# Patient Record
Sex: FEMALE | Race: WHITE | NOT HISPANIC OR LATINO | Employment: FULL TIME | URBAN - METROPOLITAN AREA
[De-identification: names, ages, dates, MRNs, and addresses within clinical notes are randomized per-mention and may not be internally consistent; named-entity substitution may affect disease eponyms.]

---

## 2023-12-27 ENCOUNTER — TELEPHONE (OUTPATIENT)
Dept: OBSTETRICS AND GYNECOLOGY | Facility: CLINIC | Age: 23
End: 2023-12-27
Payer: COMMERCIAL

## 2023-12-27 ENCOUNTER — ROUTINE PRENATAL (OUTPATIENT)
Dept: OBSTETRICS AND GYNECOLOGY | Facility: CLINIC | Age: 23
End: 2023-12-27
Payer: COMMERCIAL

## 2023-12-27 VITALS — SYSTOLIC BLOOD PRESSURE: 115 MMHG | DIASTOLIC BLOOD PRESSURE: 76 MMHG | BODY MASS INDEX: 32.19 KG/M2 | WEIGHT: 176 LBS

## 2023-12-27 DIAGNOSIS — Z34.83 ENCOUNTER FOR SUPERVISION OF OTHER NORMAL PREGNANCY IN THIRD TRIMESTER: Primary | ICD-10-CM

## 2023-12-27 DIAGNOSIS — Z28.39 RUBELLA NON-IMMUNE STATUS, ANTEPARTUM: ICD-10-CM

## 2023-12-27 DIAGNOSIS — Z28.39 MATERNAL VARICELLA, NON-IMMUNE: ICD-10-CM

## 2023-12-27 DIAGNOSIS — O09.899 RUBELLA NON-IMMUNE STATUS, ANTEPARTUM: ICD-10-CM

## 2023-12-27 DIAGNOSIS — O09.899 MATERNAL VARICELLA, NON-IMMUNE: ICD-10-CM

## 2023-12-27 DIAGNOSIS — O99.013 ANEMIA DURING PREGNANCY IN THIRD TRIMESTER: ICD-10-CM

## 2023-12-27 LAB
GLUCOSE UR STRIP-MCNC: NEGATIVE MG/DL
PROT UR STRIP-MCNC: ABNORMAL MG/DL

## 2023-12-27 RX ORDER — CARBOPROST TROMETHAMINE 250 UG/ML
250 INJECTION, SOLUTION INTRAMUSCULAR
Status: CANCELLED | OUTPATIENT
Start: 2023-12-27

## 2023-12-27 RX ORDER — MAGNESIUM CARB/ALUMINUM HYDROX 105-160MG
30 TABLET,CHEWABLE ORAL ONCE AS NEEDED
Status: CANCELLED | OUTPATIENT
Start: 2023-12-27

## 2023-12-27 RX ORDER — ONDANSETRON 4 MG/1
4 TABLET, ORALLY DISINTEGRATING ORAL EVERY 6 HOURS PRN
Status: CANCELLED | OUTPATIENT
Start: 2023-12-27

## 2023-12-27 RX ORDER — TERBUTALINE SULFATE 1 MG/ML
0.25 INJECTION, SOLUTION SUBCUTANEOUS AS NEEDED
Status: CANCELLED | OUTPATIENT
Start: 2023-12-27

## 2023-12-27 RX ORDER — SODIUM CHLORIDE 9 MG/ML
40 INJECTION, SOLUTION INTRAVENOUS AS NEEDED
Status: CANCELLED | OUTPATIENT
Start: 2023-12-27

## 2023-12-27 RX ORDER — MISOPROSTOL 100 UG/1
800 TABLET ORAL ONCE AS NEEDED
Status: CANCELLED | OUTPATIENT
Start: 2023-12-27

## 2023-12-27 RX ORDER — METHYLERGONOVINE MALEATE 0.2 MG/ML
200 INJECTION INTRAVENOUS ONCE AS NEEDED
Status: CANCELLED | OUTPATIENT
Start: 2023-12-27

## 2023-12-27 RX ORDER — ONDANSETRON 2 MG/ML
4 INJECTION INTRAMUSCULAR; INTRAVENOUS EVERY 6 HOURS PRN
Status: CANCELLED | OUTPATIENT
Start: 2023-12-27

## 2023-12-27 RX ORDER — MISOPROSTOL 100 UG/1
25 TABLET ORAL
Status: CANCELLED | OUTPATIENT
Start: 2023-12-27 | End: 2023-12-27

## 2023-12-27 RX ORDER — FAMOTIDINE 10 MG/ML
20 INJECTION, SOLUTION INTRAVENOUS 2 TIMES DAILY PRN
Status: CANCELLED | OUTPATIENT
Start: 2023-12-27

## 2023-12-27 RX ORDER — OXYTOCIN/0.9 % SODIUM CHLORIDE 30/500 ML
999 PLASTIC BAG, INJECTION (ML) INTRAVENOUS ONCE
Status: CANCELLED | OUTPATIENT
Start: 2023-12-27

## 2023-12-27 RX ORDER — OXYTOCIN/0.9 % SODIUM CHLORIDE 30/500 ML
250 PLASTIC BAG, INJECTION (ML) INTRAVENOUS CONTINUOUS
Status: CANCELLED | OUTPATIENT
Start: 2023-12-27 | End: 2023-12-27

## 2023-12-27 RX ORDER — LIDOCAINE HYDROCHLORIDE 10 MG/ML
0.5 INJECTION, SOLUTION INFILTRATION; PERINEURAL ONCE AS NEEDED
Status: CANCELLED | OUTPATIENT
Start: 2023-12-27

## 2023-12-27 RX ORDER — SODIUM CHLORIDE 0.9 % (FLUSH) 0.9 %
10 SYRINGE (ML) INJECTION AS NEEDED
Status: CANCELLED | OUTPATIENT
Start: 2023-12-27

## 2023-12-27 RX ORDER — ACETAMINOPHEN 325 MG/1
650 TABLET ORAL EVERY 4 HOURS PRN
Status: CANCELLED | OUTPATIENT
Start: 2023-12-27

## 2023-12-27 RX ORDER — SODIUM CHLORIDE 0.9 % (FLUSH) 0.9 %
10 SYRINGE (ML) INJECTION EVERY 12 HOURS SCHEDULED
Status: CANCELLED | OUTPATIENT
Start: 2023-12-27

## 2023-12-27 RX ORDER — FAMOTIDINE 10 MG
20 TABLET ORAL 2 TIMES DAILY PRN
Status: CANCELLED | OUTPATIENT
Start: 2023-12-27

## 2023-12-27 RX ORDER — SODIUM CHLORIDE, SODIUM LACTATE, POTASSIUM CHLORIDE, CALCIUM CHLORIDE 600; 310; 30; 20 MG/100ML; MG/100ML; MG/100ML; MG/100ML
125 INJECTION, SOLUTION INTRAVENOUS CONTINUOUS
Status: CANCELLED | OUTPATIENT
Start: 2023-12-27

## 2023-12-27 NOTE — PROGRESS NOTES
Ob follow up    Caleb Arriaza is a 23 y.o.  38w4d patient being seen today for her obstetrical visit. Patient reports no complaints. Fetal movement: normal.    Her prenatal care is complicated by (and status) : rubella/varicella non-immune, anemia       ROS -   Fetal Movement good   Vaginal bleeding none   Cramping/Contractions intermittent      /76   Wt 79.8 kg (176 lb)   LMP 2023   BMI 32.19 kg/m²     Prenatal Assessment  Fetal Heart Rate: 134  Fundal Height (cm): 33 cm  Movement: Present  Presentation: Vertex  Dilation/Effacement/Station  Dilation: 1  Effacement (%): 30  Station: -2  Edema  LLE Edema: Trace  RLE Edema: Trace  Facial Edema: None      Assessment    Diagnoses and all orders for this visit:    1. Encounter for supervision of other normal pregnancy in third trimester (Primary)  -     POC Urinalysis Dipstick  -     Labor Induction  -     sodium chloride 0.9 % flush 10 mL  -     sodium chloride 0.9 % flush 10 mL  -     sodium chloride 0.9 % infusion 40 mL  -     lidocaine (XYLOCAINE) 1 % injection 0.5 mL  -     lactated ringers bolus 1,000 mL  -     lactated ringers infusion  -     acetaminophen (TYLENOL) tablet 650 mg  -     ondansetron ODT (ZOFRAN-ODT) disintegrating tablet 4 mg  -     ondansetron (ZOFRAN) injection 4 mg  -     miSOPROStol (CYTOTEC) tablet 25 mcg  -     terbutaline (BRETHINE) injection 0.25 mg  -     mineral oil liquid 30 mL  -     famotidine (PEPCID) injection 20 mg  -     famotidine (PEPCID) tablet 20 mg  -     oxytocin (PITOCIN) 30 units in 0.9% sodium chloride 500 mL (premix)  -     oxytocin (PITOCIN) 30 units in 0.9% sodium chloride 500 mL (premix)  -     methylergonovine (METHERGINE) injection 200 mcg  -     carboprost (HEMABATE) injection 250 mcg  -     miSOPROStol (CYTOTEC) tablet 800 mcg  -     Tranexamic Acid 1,000 mg in sodium chloride 0.9 % 100 mL  -     ondansetron ODT (ZOFRAN-ODT) disintegrating tablet 4 mg  -     ondansetron (ZOFRAN) injection 4  mg    2. Rubella non-immune status, antepartum    3. Maternal varicella, non-immune    4. Anemia during pregnancy in third trimester    Other orders  -     Admit To Obstetrics Inpatient; Standing  -     Code Status and Medical Interventions:; Standing  -     Obtain Informed Consent; Standing  -     Place Sequential Compression Device; Standing  -     Maintain Sequential Compression Device; Standing  -     Vital Signs q 4 while awake; Standing  -     Vital Signs Per Hospital Policy; Standing  -     Mini-Prep Prior to Delivery; Standing  -     Continuous Fetal Monitoring With NST on Admission and Prior to Initiation of Oxytocin.; Standing  -     External Uterine Contraction Monitoring; Standing  -     Notify Provider (Specified); Standing  -     Notify Provider of Tachysystole (Per Hospital Algorithm); Standing  -     Notify Provider if Membranes Ruptured, Bleeding Greater Than 1 Pad Per Hour, Fetal Heart Tone Abnormality or Severe Pain; Standing  -     May Ambulate if Membranes Intact or Head Engaged With Ruptured BOW or Normal Tracing for 20 Minutes; Standing  -     Initiate Group Beta Strep (GBS) Prophylaxis Protocol, If Criteria Met; Standing  -     Position Change - For Intra-Uterine Resusitation for Hypertonus, HyperStimulation or Non-Reassuring Fetal Status; Standing  -     NPO Diet NPO Type: Ice Chips; Standing  -     Inpatient Anesthesiology Consult; Standing  -     CBC & Differential; Standing  -     Comprehensive Metabolic Panel; Standing  -     Type & Screen; Standing  -     Insert Peripheral IV; Standing  -     Saline Lock & Maintain IV Access; Standing  -     Notify Provider (Specified); Standing  -     Vital Signs Per Hospital Policy; Standing  -     Fundal & Lochia Check; Standing  -     Fundal & Lochia Check; Standing  -     Diet: Regular/House Diet; Texture: Regular Texture (IDDSI 7); Fluid Consistency: Thin (IDDSI 0); Standing  -     Advance Diet As Tolerated -; Standing        1) Pregnancy at  38w4d  2) Fetal status reassuring   3) GBS status - negative  4) Rubella/varicella non-immune   Avoid exposure, consider vaccination postpartum   5) anemia - moderate  Stable on oral iron     Desires induction   Trial on 12/30//23, instructions reviewed.     Plan    Labor warnings   [facility] [place] [facility]        Murphy Varela MD   12/27/2023  10:37 EST

## 2023-12-27 NOTE — Clinical Note
Mignon, can you see who is scheduling - I need her on for Friday into Saturday at Midnight at L&D - Thanks, Dr. Varela

## 2023-12-27 NOTE — PATIENT INSTRUCTIONS
You have scheduled the birth of your baby and are ready to welcome a new member of your family. To prepare you for that day, review these helpful instructions.     INDUCTION   In preparation for this we have you call L&D approximately 2 hours prior to scheduled time to arrive @ [phone]  Please be aware that when you are calling it is to see if they are able to have you arrive at the expected time.   Sometimes they may have to delay the start time due to ongoing issues on L&D (room availability, staffing).   If that happens, rest assured we are working to get you in as soon as safe and reasonable.   During any wait times, it is okay to eat and drink, but we would prefer you avoid heavy or large meals if possible.     BEFORE YOUR  SECTION:  You should have nothing to eat or drink 8 hours prior to the scheduled time of your surgery, typically midnight the evening before surgery  You should arrive 2 hours prior to the start time of your procedure with anyone planning to go back with you for the procedure.   We suggest taking two 500 mg tylenol the evening before surgery to help with pain management.   We also suggest drinking a 20 oz sports drink 2-2.5 hours before surgery (typically on the way to the hospital)  Please do not apply any lotions within 48 hours prior to surgery/ delivery.  Please do not shave within 48 hours prior to surgery/ delivery.   Please bathe/shower with an antibacterial soap the night before your surgery.

## 2023-12-30 ENCOUNTER — HOSPITAL ENCOUNTER (EMERGENCY)
Dept: LABOR AND DELIVERY | Facility: HOSPITAL | Age: 23
Discharge: HOME | End: 2023-12-30
Payer: COMMERCIAL

## 2023-12-30 ENCOUNTER — HOSPITAL ENCOUNTER (EMERGENCY)
Facility: HOSPITAL | Age: 23
Discharge: HOME | End: 2023-12-30
Attending: OBSTETRICS & GYNECOLOGY | Admitting: OBSTETRICS & GYNECOLOGY
Payer: COMMERCIAL

## 2023-12-30 VITALS
SYSTOLIC BLOOD PRESSURE: 103 MMHG | RESPIRATION RATE: 18 BRPM | OXYGEN SATURATION: 98 % | WEIGHT: 178.8 LBS | DIASTOLIC BLOOD PRESSURE: 72 MMHG | HEART RATE: 85 BPM | BODY MASS INDEX: 32.7 KG/M2 | TEMPERATURE: 97.7 F

## 2023-12-30 LAB — A1 MICROGLOB PLACENTAL VAG QL: NEGATIVE

## 2023-12-30 PROCEDURE — 99284 EMERGENCY DEPT VISIT MOD MDM: CPT | Performed by: OBSTETRICS & GYNECOLOGY

## 2023-12-30 PROCEDURE — 84112 EVAL AMNIOTIC FLUID PROTEIN: CPT | Performed by: OBSTETRICS & GYNECOLOGY

## 2023-12-30 PROCEDURE — 59025 FETAL NON-STRESS TEST: CPT

## 2023-12-30 RX ORDER — ACETAMINOPHEN 500 MG
1000 TABLET ORAL EVERY 6 HOURS PRN
COMMUNITY

## 2023-12-31 ENCOUNTER — ANESTHESIA EVENT (OUTPATIENT)
Dept: LABOR AND DELIVERY | Facility: HOSPITAL | Age: 23
End: 2023-12-31
Payer: COMMERCIAL

## 2023-12-31 ENCOUNTER — ANESTHESIA (OUTPATIENT)
Dept: LABOR AND DELIVERY | Facility: HOSPITAL | Age: 23
End: 2023-12-31
Payer: COMMERCIAL

## 2023-12-31 ENCOUNTER — HOSPITAL ENCOUNTER (INPATIENT)
Facility: HOSPITAL | Age: 23
LOS: 2 days | Discharge: HOME | End: 2024-01-02
Attending: OBSTETRICS & GYNECOLOGY | Admitting: OBSTETRICS & GYNECOLOGY
Payer: COMMERCIAL

## 2023-12-31 DIAGNOSIS — Z34.83 ENCOUNTER FOR SUPERVISION OF OTHER NORMAL PREGNANCY IN THIRD TRIMESTER: ICD-10-CM

## 2023-12-31 LAB
ABO GROUP BLD: NORMAL
ALBUMIN SERPL-MCNC: 3.4 G/DL (ref 3.5–5.2)
ALBUMIN/GLOB SERPL: 1.2 G/DL
ALP SERPL-CCNC: 113 U/L (ref 39–117)
ALT SERPL W P-5'-P-CCNC: 9 U/L (ref 1–33)
ANION GAP SERPL CALCULATED.3IONS-SCNC: 11.5 MMOL/L (ref 5–15)
AST SERPL-CCNC: 10 U/L (ref 1–32)
BASOPHILS # BLD AUTO: 0.02 10*3/MM3 (ref 0–0.2)
BASOPHILS NFR BLD AUTO: 0.2 % (ref 0–1.5)
BILIRUB SERPL-MCNC: <0.2 MG/DL (ref 0–1.2)
BLD GP AB SCN SERPL QL: POSITIVE
BUN SERPL-MCNC: 9 MG/DL (ref 6–20)
BUN/CREAT SERPL: 17 (ref 7–25)
CALCIUM SPEC-SCNC: 9.7 MG/DL (ref 8.6–10.5)
CHLORIDE SERPL-SCNC: 106 MMOL/L (ref 98–107)
CO2 SERPL-SCNC: 18.5 MMOL/L (ref 22–29)
CREAT SERPL-MCNC: 0.53 MG/DL (ref 0.57–1)
DEPRECATED RDW RBC AUTO: 46.7 FL (ref 37–54)
EGFRCR SERPLBLD CKD-EPI 2021: 132.6 ML/MIN/1.73
EOSINOPHIL # BLD AUTO: 0.05 10*3/MM3 (ref 0–0.4)
EOSINOPHIL NFR BLD AUTO: 0.6 % (ref 0.3–6.2)
ERYTHROCYTE [DISTWIDTH] IN BLOOD BY AUTOMATED COUNT: 16.2 % (ref 12.3–15.4)
GLOBULIN UR ELPH-MCNC: 2.8 GM/DL
GLUCOSE SERPL-MCNC: 78 MG/DL (ref 65–99)
HCT VFR BLD AUTO: 29.7 % (ref 34–46.6)
HGB BLD-MCNC: 9.4 G/DL (ref 12–15.9)
IMM GRANULOCYTES # BLD AUTO: 0.05 10*3/MM3 (ref 0–0.05)
IMM GRANULOCYTES NFR BLD AUTO: 0.6 % (ref 0–0.5)
LYMPHOCYTES # BLD AUTO: 1.3 10*3/MM3 (ref 0.7–3.1)
LYMPHOCYTES NFR BLD AUTO: 15 % (ref 19.6–45.3)
MCH RBC QN AUTO: 25.3 PG (ref 26.6–33)
MCHC RBC AUTO-ENTMCNC: 31.6 G/DL (ref 31.5–35.7)
MCV RBC AUTO: 80.1 FL (ref 79–97)
MONOCYTES # BLD AUTO: 0.47 10*3/MM3 (ref 0.1–0.9)
MONOCYTES NFR BLD AUTO: 5.4 % (ref 5–12)
NEUTROPHILS NFR BLD AUTO: 6.77 10*3/MM3 (ref 1.7–7)
NEUTROPHILS NFR BLD AUTO: 78.2 % (ref 42.7–76)
NRBC BLD AUTO-RTO: 0 /100 WBC (ref 0–0.2)
PLATELET # BLD AUTO: 158 10*3/MM3 (ref 140–450)
PMV BLD AUTO: 11.8 FL (ref 6–12)
POTASSIUM SERPL-SCNC: 3.9 MMOL/L (ref 3.5–5.2)
PROT SERPL-MCNC: 6.2 G/DL (ref 6–8.5)
RBC # BLD AUTO: 3.71 10*6/MM3 (ref 3.77–5.28)
RESIDUAL RHIG DETECTED: NORMAL
RH BLD: NEGATIVE
SODIUM SERPL-SCNC: 136 MMOL/L (ref 136–145)
T&S EXPIRATION DATE: NORMAL
WBC NRBC COR # BLD AUTO: 8.66 10*3/MM3 (ref 3.4–10.8)

## 2023-12-31 PROCEDURE — 25810000003 LACTATED RINGERS PER 1000 ML: Performed by: OBSTETRICS & GYNECOLOGY

## 2023-12-31 PROCEDURE — 86870 RBC ANTIBODY IDENTIFICATION: CPT | Performed by: OBSTETRICS & GYNECOLOGY

## 2023-12-31 PROCEDURE — 86900 BLOOD TYPING SEROLOGIC ABO: CPT | Performed by: OBSTETRICS & GYNECOLOGY

## 2023-12-31 PROCEDURE — 80053 COMPREHEN METABOLIC PANEL: CPT | Performed by: OBSTETRICS & GYNECOLOGY

## 2023-12-31 PROCEDURE — 10907ZC DRAINAGE OF AMNIOTIC FLUID, THERAPEUTIC FROM PRODUCTS OF CONCEPTION, VIA NATURAL OR ARTIFICIAL OPENING: ICD-10-PCS | Performed by: OBSTETRICS & GYNECOLOGY

## 2023-12-31 PROCEDURE — 3E033VJ INTRODUCTION OF OTHER HORMONE INTO PERIPHERAL VEIN, PERCUTANEOUS APPROACH: ICD-10-PCS | Performed by: OBSTETRICS & GYNECOLOGY

## 2023-12-31 PROCEDURE — 86850 RBC ANTIBODY SCREEN: CPT | Performed by: OBSTETRICS & GYNECOLOGY

## 2023-12-31 PROCEDURE — 86901 BLOOD TYPING SEROLOGIC RH(D): CPT | Performed by: OBSTETRICS & GYNECOLOGY

## 2023-12-31 PROCEDURE — 0UQMXZZ REPAIR VULVA, EXTERNAL APPROACH: ICD-10-PCS | Performed by: OBSTETRICS & GYNECOLOGY

## 2023-12-31 PROCEDURE — 59410 OBSTETRICAL CARE: CPT | Performed by: OBSTETRICS & GYNECOLOGY

## 2023-12-31 PROCEDURE — C1755 CATHETER, INTRASPINAL: HCPCS | Performed by: ANESTHESIOLOGY

## 2023-12-31 PROCEDURE — 85025 COMPLETE CBC W/AUTO DIFF WBC: CPT | Performed by: OBSTETRICS & GYNECOLOGY

## 2023-12-31 PROCEDURE — 25010000002 MORPHINE PER 10 MG: Performed by: OBSTETRICS & GYNECOLOGY

## 2023-12-31 RX ORDER — PROMETHAZINE HYDROCHLORIDE 25 MG/1
25 TABLET ORAL EVERY 6 HOURS PRN
Status: DISCONTINUED | OUTPATIENT
Start: 2023-12-31 | End: 2024-01-02 | Stop reason: HOSPADM

## 2023-12-31 RX ORDER — FAMOTIDINE 10 MG/ML
20 INJECTION, SOLUTION INTRAVENOUS 2 TIMES DAILY PRN
Status: DISCONTINUED | OUTPATIENT
Start: 2023-12-31 | End: 2023-12-31 | Stop reason: HOSPADM

## 2023-12-31 RX ORDER — ONDANSETRON 4 MG/1
4 TABLET, ORALLY DISINTEGRATING ORAL EVERY 6 HOURS PRN
Status: DISCONTINUED | OUTPATIENT
Start: 2023-12-31 | End: 2024-01-02 | Stop reason: HOSPADM

## 2023-12-31 RX ORDER — MISOPROSTOL 200 UG/1
800 TABLET ORAL ONCE AS NEEDED
Status: DISCONTINUED | OUTPATIENT
Start: 2023-12-31 | End: 2024-01-02 | Stop reason: HOSPADM

## 2023-12-31 RX ORDER — SODIUM CHLORIDE 9 MG/ML
40 INJECTION, SOLUTION INTRAVENOUS AS NEEDED
Status: DISCONTINUED | OUTPATIENT
Start: 2023-12-31 | End: 2023-12-31 | Stop reason: HOSPADM

## 2023-12-31 RX ORDER — ACETAMINOPHEN 325 MG/1
650 TABLET ORAL EVERY 6 HOURS PRN
Status: DISCONTINUED | OUTPATIENT
Start: 2023-12-31 | End: 2024-01-02 | Stop reason: HOSPADM

## 2023-12-31 RX ORDER — OXYTOCIN/0.9 % SODIUM CHLORIDE 30/500 ML
2-20 PLASTIC BAG, INJECTION (ML) INTRAVENOUS
Status: DISCONTINUED | OUTPATIENT
Start: 2023-12-31 | End: 2024-01-02

## 2023-12-31 RX ORDER — BISACODYL 10 MG
10 SUPPOSITORY, RECTAL RECTAL DAILY PRN
Status: DISCONTINUED | OUTPATIENT
Start: 2024-01-01 | End: 2024-01-02 | Stop reason: HOSPADM

## 2023-12-31 RX ORDER — PROMETHAZINE HYDROCHLORIDE 12.5 MG/1
12.5 SUPPOSITORY RECTAL EVERY 6 HOURS PRN
Status: DISCONTINUED | OUTPATIENT
Start: 2023-12-31 | End: 2024-01-02 | Stop reason: HOSPADM

## 2023-12-31 RX ORDER — FAMOTIDINE 20 MG/1
20 TABLET, FILM COATED ORAL 2 TIMES DAILY PRN
Status: DISCONTINUED | OUTPATIENT
Start: 2023-12-31 | End: 2023-12-31 | Stop reason: HOSPADM

## 2023-12-31 RX ORDER — TERBUTALINE SULFATE 1 MG/ML
0.25 INJECTION, SOLUTION SUBCUTANEOUS AS NEEDED
Status: DISCONTINUED | OUTPATIENT
Start: 2023-12-31 | End: 2023-12-31 | Stop reason: HOSPADM

## 2023-12-31 RX ORDER — DIPHENHYDRAMINE HYDROCHLORIDE 50 MG/ML
12.5 INJECTION INTRAMUSCULAR; INTRAVENOUS EVERY 8 HOURS PRN
Status: DISCONTINUED | OUTPATIENT
Start: 2023-12-31 | End: 2023-12-31 | Stop reason: HOSPADM

## 2023-12-31 RX ORDER — MORPHINE SULFATE 2 MG/ML
1 INJECTION, SOLUTION INTRAMUSCULAR; INTRAVENOUS EVERY 4 HOURS PRN
Status: DISCONTINUED | OUTPATIENT
Start: 2023-12-31 | End: 2024-01-02

## 2023-12-31 RX ORDER — SODIUM CHLORIDE 0.9 % (FLUSH) 0.9 %
1-10 SYRINGE (ML) INJECTION AS NEEDED
Status: DISCONTINUED | OUTPATIENT
Start: 2023-12-31 | End: 2024-01-02 | Stop reason: HOSPADM

## 2023-12-31 RX ORDER — ONDANSETRON 2 MG/ML
4 INJECTION INTRAMUSCULAR; INTRAVENOUS ONCE AS NEEDED
Status: DISCONTINUED | OUTPATIENT
Start: 2023-12-31 | End: 2023-12-31 | Stop reason: HOSPADM

## 2023-12-31 RX ORDER — ONDANSETRON 4 MG/1
4 TABLET, ORALLY DISINTEGRATING ORAL EVERY 6 HOURS PRN
Status: DISCONTINUED | OUTPATIENT
Start: 2023-12-31 | End: 2023-12-31 | Stop reason: HOSPADM

## 2023-12-31 RX ORDER — CARBOPROST TROMETHAMINE 250 UG/ML
250 INJECTION, SOLUTION INTRAMUSCULAR
Status: DISCONTINUED | OUTPATIENT
Start: 2023-12-31 | End: 2024-01-02 | Stop reason: HOSPADM

## 2023-12-31 RX ORDER — MAGNESIUM CARB/ALUMINUM HYDROX 105-160MG
30 TABLET,CHEWABLE ORAL ONCE AS NEEDED
Status: DISCONTINUED | OUTPATIENT
Start: 2023-12-31 | End: 2023-12-31 | Stop reason: HOSPADM

## 2023-12-31 RX ORDER — PRENATAL VIT/IRON FUM/FOLIC AC 27MG-0.8MG
1 TABLET ORAL DAILY
Status: DISCONTINUED | OUTPATIENT
Start: 2024-01-01 | End: 2024-01-02 | Stop reason: HOSPADM

## 2023-12-31 RX ORDER — FENTANYL CIT 0.2 MG/100ML-ROPIV 0.2%-NACL 0.9% EPIDURAL INJ 2/0.2 MCG/ML-%
10 SOLUTION INJECTION CONTINUOUS
Status: DISCONTINUED | OUTPATIENT
Start: 2023-12-31 | End: 2024-01-02

## 2023-12-31 RX ORDER — TRAMADOL HYDROCHLORIDE 50 MG/1
50 TABLET ORAL EVERY 6 HOURS PRN
Status: DISCONTINUED | OUTPATIENT
Start: 2023-12-31 | End: 2024-01-02 | Stop reason: HOSPADM

## 2023-12-31 RX ORDER — ONDANSETRON 2 MG/ML
4 INJECTION INTRAMUSCULAR; INTRAVENOUS EVERY 6 HOURS PRN
Status: DISCONTINUED | OUTPATIENT
Start: 2023-12-31 | End: 2024-01-02 | Stop reason: HOSPADM

## 2023-12-31 RX ORDER — ONDANSETRON 2 MG/ML
4 INJECTION INTRAMUSCULAR; INTRAVENOUS EVERY 6 HOURS PRN
Status: DISCONTINUED | OUTPATIENT
Start: 2023-12-31 | End: 2023-12-31 | Stop reason: HOSPADM

## 2023-12-31 RX ORDER — METHYLERGONOVINE MALEATE 0.2 MG/ML
200 INJECTION INTRAVENOUS ONCE AS NEEDED
Status: DISCONTINUED | OUTPATIENT
Start: 2023-12-31 | End: 2024-01-02 | Stop reason: HOSPADM

## 2023-12-31 RX ORDER — ACETAMINOPHEN 325 MG/1
650 TABLET ORAL EVERY 4 HOURS PRN
Status: DISCONTINUED | OUTPATIENT
Start: 2023-12-31 | End: 2023-12-31 | Stop reason: HOSPADM

## 2023-12-31 RX ORDER — FAMOTIDINE 10 MG/ML
20 INJECTION, SOLUTION INTRAVENOUS ONCE AS NEEDED
Status: DISCONTINUED | OUTPATIENT
Start: 2023-12-31 | End: 2023-12-31 | Stop reason: HOSPADM

## 2023-12-31 RX ORDER — EPHEDRINE SULFATE 50 MG/ML
5 INJECTION, SOLUTION INTRAVENOUS
Status: DISCONTINUED | OUTPATIENT
Start: 2023-12-31 | End: 2023-12-31 | Stop reason: HOSPADM

## 2023-12-31 RX ORDER — OXYTOCIN/0.9 % SODIUM CHLORIDE 30/500 ML
125 PLASTIC BAG, INJECTION (ML) INTRAVENOUS CONTINUOUS PRN
Status: COMPLETED | OUTPATIENT
Start: 2023-12-31 | End: 2023-12-31

## 2023-12-31 RX ORDER — SODIUM CHLORIDE 0.9 % (FLUSH) 0.9 %
10 SYRINGE (ML) INJECTION AS NEEDED
Status: DISCONTINUED | OUTPATIENT
Start: 2023-12-31 | End: 2023-12-31 | Stop reason: HOSPADM

## 2023-12-31 RX ORDER — OXYTOCIN/0.9 % SODIUM CHLORIDE 30/500 ML
999 PLASTIC BAG, INJECTION (ML) INTRAVENOUS ONCE
Status: COMPLETED | OUTPATIENT
Start: 2023-12-31 | End: 2023-12-31

## 2023-12-31 RX ORDER — OXYTOCIN/0.9 % SODIUM CHLORIDE 30/500 ML
250 PLASTIC BAG, INJECTION (ML) INTRAVENOUS CONTINUOUS
Status: DISPENSED | OUTPATIENT
Start: 2023-12-31 | End: 2023-12-31

## 2023-12-31 RX ORDER — HYDROCORTISONE 25 MG/G
CREAM TOPICAL AS NEEDED
Status: DISCONTINUED | OUTPATIENT
Start: 2023-12-31 | End: 2024-01-02 | Stop reason: HOSPADM

## 2023-12-31 RX ORDER — IBUPROFEN 600 MG/1
600 TABLET ORAL EVERY 6 HOURS PRN
Status: DISCONTINUED | OUTPATIENT
Start: 2023-12-31 | End: 2024-01-02 | Stop reason: HOSPADM

## 2023-12-31 RX ORDER — MISOPROSTOL 100 MCG
25 TABLET ORAL
Status: DISCONTINUED | OUTPATIENT
Start: 2023-12-31 | End: 2023-12-31

## 2023-12-31 RX ORDER — NALOXONE HCL 0.4 MG/ML
0.4 VIAL (ML) INJECTION
Status: DISCONTINUED | OUTPATIENT
Start: 2023-12-31 | End: 2024-01-02

## 2023-12-31 RX ORDER — TRANEXAMIC ACID 10 MG/ML
1000 INJECTION, SOLUTION INTRAVENOUS ONCE AS NEEDED
Status: DISCONTINUED | OUTPATIENT
Start: 2023-12-31 | End: 2024-01-02 | Stop reason: HOSPADM

## 2023-12-31 RX ORDER — SODIUM CHLORIDE, SODIUM LACTATE, POTASSIUM CHLORIDE, CALCIUM CHLORIDE 600; 310; 30; 20 MG/100ML; MG/100ML; MG/100ML; MG/100ML
125 INJECTION, SOLUTION INTRAVENOUS CONTINUOUS
Status: DISCONTINUED | OUTPATIENT
Start: 2023-12-31 | End: 2024-01-02

## 2023-12-31 RX ORDER — DOCUSATE SODIUM 100 MG/1
100 CAPSULE, LIQUID FILLED ORAL 2 TIMES DAILY
Status: DISCONTINUED | OUTPATIENT
Start: 2023-12-31 | End: 2024-01-02 | Stop reason: HOSPADM

## 2023-12-31 RX ORDER — LIDOCAINE HYDROCHLORIDE 10 MG/ML
0.5 INJECTION, SOLUTION INFILTRATION; PERINEURAL ONCE AS NEEDED
Status: DISCONTINUED | OUTPATIENT
Start: 2023-12-31 | End: 2023-12-31 | Stop reason: HOSPADM

## 2023-12-31 RX ORDER — SODIUM CHLORIDE 0.9 % (FLUSH) 0.9 %
10 SYRINGE (ML) INJECTION EVERY 12 HOURS SCHEDULED
Status: DISCONTINUED | OUTPATIENT
Start: 2023-12-31 | End: 2023-12-31 | Stop reason: HOSPADM

## 2023-12-31 RX ADMIN — Medication 250 ML/HR: at 18:15

## 2023-12-31 RX ADMIN — Medication 999 ML/HR: at 17:50

## 2023-12-31 RX ADMIN — MORPHINE SULFATE 1 MG: 2 INJECTION, SOLUTION INTRAMUSCULAR; INTRAVENOUS at 14:57

## 2023-12-31 RX ADMIN — SODIUM CHLORIDE, POTASSIUM CHLORIDE, SODIUM LACTATE AND CALCIUM CHLORIDE 125 ML/HR: 600; 310; 30; 20 INJECTION, SOLUTION INTRAVENOUS at 09:37

## 2023-12-31 RX ADMIN — Medication 2 MILLI-UNITS/MIN: at 09:57

## 2023-12-31 RX ADMIN — TRAMADOL HYDROCHLORIDE 50 MG: 50 TABLET ORAL at 22:02

## 2023-12-31 RX ADMIN — Medication 125 ML/HR: at 18:45

## 2023-12-31 NOTE — ANESTHESIA POSTPROCEDURE EVALUATION
"Patient: Caleb LARKIN    Procedure Summary       Date: 12/31/23 Room / Location:     Anesthesia Start: 1559 Anesthesia Stop: 1744    Procedure: LABOR ANALGESIA Diagnosis:     Scheduled Providers:  Provider: Luz Marina Gtz MD    Anesthesia Type: epidural ASA Status: 2            Anesthesia Type: epidural    Vitals  Vitals Value Taken Time   BP 76/63 12/31/23 1746   Temp 36.9 °C (98.4 °F) 12/31/23 1710   Pulse 150 12/31/23 1746   Resp     SpO2 100 % 12/31/23 1740   Vitals shown include unfiled device data.        Post Anesthesia Care and Evaluation      Comments: /88   Pulse 90   Temp 36.9 °C (98.4 °F) (Oral)   Resp 16   Ht 157.5 cm (62\")   LMP 04/01/2023   SpO2 100%   Breastfeeding Yes   BMI 32.70 kg/m²     MD was available throughout the duration of the labor epidural.      "

## 2023-12-31 NOTE — PROGRESS NOTES
Cervix is 8 cm/90%/-2.  Nursing asked that IUPC be placed due to previous episodes of variable decelerations that have now resolved.  IUPC placed without difficulty with return of clear fluid in tubing.  Fetal tracing currently Category 1.  Continue with pitocin induction.

## 2023-12-31 NOTE — OBED NOTES
ROBB Note OB        Patient Name: Caleb LARKIN  YOB: 2000  MRN: [id number]  Admission Date: 2023  8:38 PM  Date of Service: 2023    Chief Complaint: Decreased Fetal Movement (ROBB- pt reports not feeling baby movements for 3 hour at home and felt movement upon arrival to hospital. Pt is having contractions rating 4/10 every 8-10 min, Possible leaking fluid. Denies VB)        Subjective     Caleb LARKIN is a 23 y.o. female  at 39w0d with Estimated Date of Delivery: 24 who presents with the chief complaint listed above.  She sees Murphy Varela MD for her prenatal care. Her pregnancy has been complicated by:  kidney stone, rubella non-immune.    She describes fetal movement as decreased.  She denies rupture of membranes.  She denies vaginal bleeding. She is feeling contractions.          Objective   Patient Active Problem List    Diagnosis     Pregnancy [Z34.90]     Kidney stone [N20.0]     Pregnant [Z34.90]     Rubella non-immune status, antepartum [O09.899, Z28.39]     Nausea [R11.0]     Earache on right [H92.01]     Cough [R05.9]         OB History    Para Term  AB Living   2 1 1 0 0 1   SAB IAB Ectopic Molar Multiple Live Births   0 0 0 0 0 1      # Outcome Date GA Lbr [place]/2nd Weight Sex Delivery Anes PTL Lv   2 Current            1 Term 21 40w2d  3062 g (6 lb 12 oz) M Vag-Spont EPI N DAVID        Past Medical History:   Diagnosis Date    Strep throat        Past Surgical History:   Procedure Laterality Date    FOOT SURGERY  at age 9    KIDNEY SURGERY  at age 5    TONSILLECTOMY      at age 9    WISDOM TOOTH EXTRACTION         No current facility-administered medications on file prior to encounter.     Current Outpatient Medications on File Prior to Encounter   Medication Sig Dispense Refill    acetaminophen (TYLENOL) 500 MG tablet Take 2 tablets by mouth Every 6 (Six) Hours As Needed for Mild Pain.      cyclobenzaprine (FLEXERIL) 5 MG  tablet Take 1 tablet by mouth Every 8 (Eight) Hours As Needed for Muscle Spasms. 20 tablet 0    ferrous sulfate 325 (65 FE) MG tablet Take 1 tablet by mouth Daily With Breakfast. 30 tablet 6    Prenatal Vit-Fe Fum-FA-Omega (Prenatal Formula) 28-0.8-235 MG capsule          No Known Allergies    Family History   Problem Relation Age of Onset    No Known Problems Mother     No Known Problems Father     Breast cancer Neg Hx     Ovarian cancer Neg Hx     Uterine cancer Neg Hx     Colon cancer Neg Hx     Deep vein thrombosis Neg Hx     Pulmonary embolism Neg Hx        Social History     Socioeconomic History    Marital status: Single   Tobacco Use    Smoking status: Former     Types: Electronic Cigarette    Smokeless tobacco: Never   Vaping Use    Vaping Use: Never used   Substance and Sexual Activity    Alcohol use: No    Drug use: No    Sexual activity: Yes     Partners: Male           Review of Systems   HENT: Negative.     Respiratory: Negative.     Cardiovascular: Negative.    Gastrointestinal:  Positive for abdominal pain.   Genitourinary:  Negative for vaginal bleeding.   Neurological: Negative.         PHYSICAL EXAM:      VITAL SIGNS:  Vitals:    12/30/23 2130 12/30/23 2135 12/30/23 2140 12/30/23 2145   BP: 103/72      BP Location:       Patient Position:       Pulse: 79 83 82 74   Resp:       Temp:       TempSrc:       SpO2: 99% 99% 99% 99%   Weight:            FHT'S:                   Baseline:  120 BPM  Variability:  Moderate = 6 - 25 BPM  Accelerations:  15 x 15 accelerations present     Decelerations:  absent  Contractions:   absent     Interpretation:    Reactive NST, CAT 1 tracing        PHYSICAL EXAM:    General: well developed; well nourished  no acute distress   Heart: Not performed.   Lungs  : breathing is unlabored     Abdomen: soft, non-tender; no masses       Cervix: was checked (by RN): 1-2 cm / 50 % / -2  Cervical Dilation (cm): 1-2  Cervical Effacement: 50%  Fetal Station: -2  Cervical  Consistency: medium  Cervical Position: posterior   Contractions: none        Extremities: peripheral pulses normal, no pedal edema, no clubbing or cyanosis      LABS AND TESTING ORDERED:  Uterine and fetal monitoring  Urinalysis  ROM +    LAB RESULTS:    Recent Results (from the past 24 hour(s))   Rapid Assay For ROM - Amniotic Fluid, Amniotic Sac    Collection Time: 23  9:08 PM    Specimen: Amniotic Sac; Amniotic Fluid   Result Value Ref Range    Rupture of Membranes Negative Negative       Lab Results   Component Value Date    ABO B 2023    RH Negative 2023       Lab Results   Component Value Date    STREPGPB Negative 12/15/2023                 Assessment & Plan     ASSESSMENT/PLAN:  Caleb LARKIN is a 23 y.o. female  at 39w0d who presented with: decreased fetal movement, contractions, leaking fluid          Final Impression:  Pregnancy at 39w0d  Reactive NST.  CAT 1 tracing  ROM + negative  Maternal vital signs were reviewed and were unremarkable              Vitals:    23 2130 23 2135 23 2140 23 214   BP: 103/72      BP Location:       Patient Position:       Pulse: 79 83 82 74   Resp:       Temp:       TempSrc:       SpO2: 99% 99% 99% 99%   Weight:           Lab Results   Component Value Date    STREPGPB Negative 12/15/2023     Lab Results   Component Value Date    ABO B 2023    RH Negative 2023         PLAN:     ROM + negative  Reactive NST  Patient discharged home with labor precautions.    I have spent 10 minutes including face to face time with the patient, greater than 50% in discussion of the diagnosis (counseling) and/or coordination of care.     Emily Marin MD  2023  21:51 EST  OB Hospitalist  Phone:  [phone]

## 2023-12-31 NOTE — PLAN OF CARE
Problem: Bleeding (Labor)  Goal: Hemostasis  Outcome: Met     Problem: Change in Fetal Wellbeing (Labor)  Goal: Stable Fetal Wellbeing  Outcome: Met     Problem: Delayed Labor Progression (Labor)  Goal: Effective Progression to Delivery  Outcome: Met     Problem: Infection (Labor)  Goal: Absence of Infection Signs and Symptoms  Outcome: Met     Problem: Labor Pain (Labor)  Goal: Acceptable Pain Control  Outcome: Met     Problem: Uterine Tachysystole (Labor)  Goal: Normal Uterine Contraction Pattern  Outcome: Met   Goal Outcome Evaluation:  Plan of Care Reviewed With: patient, spouse, mother, friend           Outcome Evaluation: VSS, RNST. Pt came in for scheduled induction, pt denied CTX, LOF and reported +FM. Started induction with pitocin, continued titrating based on policy. Pt got epidural, AROM at 1200, complete dilation at 1740, started pushing, viable baby bor born at 1744. Bleeding stable at this time, will transfer to mother/baby.

## 2023-12-31 NOTE — ANESTHESIA PROCEDURE NOTES
Labor Epidural    Pre-sedation assessment completed: 12/31/2023 3:59 PM    Patient location during procedure: OB  Start Time: 12/31/2023 3:59 PM  Stop Time: 12/31/2023 4:08 PM  Indication:at surgeon's request  Performed By  Anesthesiologist: Luz Marina Gtz MD  Preanesthetic Checklist  Completed: patient identified, IV checked, site marked, risks and benefits discussed, surgical consent, monitors and equipment checked, pre-op evaluation and timeout performed  Additional Notes  Attempt midline @ L4/5 w/o success, then @ L3/4 w/o success.  Approached RPM @ L3/4 w/ good AKIN, catheter threaded w/ ease.  Negative aspiration/negative test dose @ 1608.    Prep:  Pt Position:sitting  Sterile Tech:cap, gloves, mask and sterile barrier  Prep:chlorhexidine gluconate and isopropyl alcohol  Monitoring:blood pressure monitoring, continuous pulse oximetry and EKG  Epidural Block Procedure:  Approach:midline  Guidance:landmark technique  Location:L4-L5  Needle Type:Tuohy  Needle Gauge:17  Loss of Resistance Medium: saline  Loss of Resistance: 6cm  Cath Depth at skin:10 cm  Paresthesia: none  Aspiration:negative  Test Dose:negative  Number of Attempts: 3  Post Assessment:  Dressing:occlusive dressing applied and secured with tape  Pt Tolerance:patient tolerated the procedure well with no apparent complications  Complications:no

## 2023-12-31 NOTE — L&D DELIVERY NOTE
[facility]   Vaginal Delivery Note    Patient Name: Caleb LARKIN  : 2000  MRN: [id number]    Date of Delivery: 2023     Diagnosis     Pre & Post-Delivery:  Intrauterine pregnancy at 39w1d  Labor status: Induced Onset of Labor      (normal spontaneous vaginal delivery)    Encounter for supervision of other normal pregnancy in third trimester             Problem List    Transfer to Postpartum     Review the Delivery Report for details.     Delivery     Delivery: Vaginal, Spontaneous     YOB: 2024    Time of Birth:  Gestational Age 5:44 PM   39w1d     Anesthesia: Epidural     Delivering clinician: Torri Byrd    Forceps?   No   Vacuum? No    Shoulder dystocia present: No        Delivery narrative: Patient is a 23-year-old -0-0-1 who was admitted at 39 weeks and 1 day for elective induction.  Her pregnancy was uncomplicated.  She was 1 to 2 cm on admission and GBS negative.  She was started on Pitocin.  At 2 cm, she underwent assisted rupture of membranes for clear fluid and received an epidural.  She progressed along a normal multiparous labor curve to complete dilation and +2 station.  She pushed with approximately 3 contractions and delivered a liveborn male in the occiput anterior position over an intact perineum.  With gentle posterior guidance of the fetal head and maternal pushing, the left anterior shoulder was easily delivered, followed by remainder of the body.  The infant was immediately vigorous and placed on mom's chest.  Delayed cord clamping was performed for 30 seconds and cord was clamped and cut by the father.  Cord blood was collected and sent.  The placenta then delivered spontaneously and was intact with a three-vessel cord.  Inspection of her perineum revealed a right labial laceration that was repaired with 3-0 Vicryl in a running fashion.  Perineum was hemostatic.  Bimanual exam revealed a firm uterus with no remaining products of conception.  " She tolerated the procedure well.  All counts were correct at the end of the procedure.      Infant     Findings: male  infant     Infant observations: Weight: No birth weight on file.   Length:   in  Observations/Comments:  Scale #      Apgars: 8  @ 1 minute /    9  @ 5 minutes   Infant Name: Joe     Placenta & Cord         Placenta delivered  Spontaneous  at   12/31/2023  5:50 PM     Cord: 3 vessels  present.   Nuchal Cord?  no   Cord blood obtained: Yes    Cord gases obtained:  No    Cord gas results: Venous:  No results found for: \"PHCVEN\", \"BECVEN\"    Arterial:  No results found for: \"PHCART\", \"BECART\"     Repair     Episiotomy: None     No    Lacerations: Yes  Laceration Information  Laceration Repaired?   Perineal: None      Periurethral:       Labial: right  Yes    Sulcus:       Vaginal:       Cervical:         Suture used for repair: 3-0 Vicryl   Estimated Blood Loss:       Quantitative Blood Loss: Quantitative Blood Loss (mL): 70 mL (12/31/23 1800)        Complications     none    Disposition     Mother to Mother Baby/Postpartum  in stable condition currently.  Baby to remains with mom  in stable condition currently.    Torri Byrd MD  12/31/23  18:36 EST        "

## 2023-12-31 NOTE — ANESTHESIA PREPROCEDURE EVALUATION
" Anesthesia Evaluation     Patient summary reviewed and Nursing notes reviewed   no history of anesthetic complications:                Airway   Mallampati: II  Dental      Pulmonary    (+) a smoker Former,recent URI  Cardiovascular - negative cardio ROS  Exercise tolerance: good (4-7 METS)        Neuro/Psych- negative ROS  GI/Hepatic/Renal/Endo    (+) renal disease- stones    Musculoskeletal (-) negative ROS    Abdominal    Substance History - negative use     OB/GYN    (+) Pregnant        Other                    Anesthesia Plan    ASA 2     epidural     (  39w1d    /59   Pulse 84   Temp 36.9 °C (98.4 °F) (Oral)   Resp 16   Ht 157.5 cm (62\")   LMP 2023   SpO2 100%   Breastfeeding Yes   BMI 32.70 kg/m²     I have reviewed the patient's history with the patient and the chart, including all pertinent laboratory results and imaging. I have explained the risks of anesthesia including but not limited to dental damage, corneal abrasion, nerve injury, MI, stroke, and death.    )    Anesthetic plan, risks, benefits, and alternatives have been provided, discussed and informed consent has been obtained with: patient.    CODE STATUS:    Level Of Support Discussed With: Patient  Code Status (Patient has no pulse and is not breathing): CPR (Attempt to Resuscitate)  Medical Interventions (Patient has pulse or is breathing): Full Support      "

## 2023-12-31 NOTE — H&P
[facility]  Obstetric History and Physical    Chief Complaint   Patient presents with    Scheduled Induction     No LOF or CTX, pt reports +FM         Subjective     Patient is a 23 y.o. female  currently at 39w1d, who presents for elective induction.  Prenatal care was benign.    The following portions of the patients history were reviewed and updated as appropriate: current medications, allergies, past medical history, past surgical history, past family history, past social history, and problem list .       Prenatal Information:  Prenatal Results       Initial Prenatal Labs       Test Value Reference Range Date Time    Hemoglobin  13.0 g/dL 11.1 - 15.9 05/10/23 0957    Hematocrit  38.2 % 34.0 - 46.6 05/10/23 0957    Platelets  236 x10E3/uL 150 - 450 05/10/23 0957    Rubella IgG  <0.90 index Immune >0.99 05/10/23 0957    Hepatitis B SAg  Negative  Negative 05/10/23 0957    Hepatitis C Ab  Non Reactive  Non Reactive 05/10/23 0957    RPR  Non Reactive  Non Reactive 05/10/23 0957    T. Pallidum Ab         ABO  B   23 0936    Rh  Negative   23 0936    Antibody Screen  Negative  Negative 05/10/23 0957    HIV  Non Reactive  Non Reactive 05/10/23 0957    Urine Culture  No growth   23 1549       No growth   23 1050       Final report   10/23/23 1549       Final report   23 0345       Final report   05/10/23 1100    Gonorrhea  Negative  Negative 05/10/23 1109    Chlamydia  Negative  Negative 05/10/23 1109    TSH        HgB A1c         Varicella IgG  <135 index Immune >165 05/10/23 0957    HgB Electrophoresis         Cystic fibrosis                   Fetal testing        Test Value Reference Range Date Time    NIPT        MSAFP        AFP-4                  2nd and 3rd Trimester       Test Value Reference Range Date Time    Hemoglobin (repeated)  9.4 g/dL 12.0 - 15.9 23 0936       9.5 g/dL 12.0 - 15.9 23 1548       8.6 g/dL 12.0 - 15.9 23 0649       9.0 g/dL 12.0 -  15.9 11/29/23 1217       10.2 g/dL 12.0 - 15.9 10/09/23 1230    Hematocrit (repeated)  29.7 % 34.0 - 46.6 12/31/23 0936       29.6 % 34.0 - 46.6 12/02/23 1548       26.9 % 34.0 - 46.6 11/30/23 0649       29.1 % 34.0 - 46.6 11/29/23 1217       30.5 % 34.0 - 46.6 10/09/23 1230    Platelets   158 10*3/mm3 140 - 450 12/31/23 0936       179 10*3/mm3 140 - 450 12/02/23 1548       154 10*3/mm3 140 - 450 11/30/23 0649       182 10*3/mm3 140 - 450 11/29/23 1217       171 10*3/mm3 140 - 450 10/09/23 1230       236 x10E3/uL 150 - 450 05/10/23 0957    GCT  107 mg/dL 65 - 139 10/09/23 1230    Antibody Screen (repeated)  Positive   12/31/23 0936       Positive   12/02/23 1902       Positive   11/29/23 1217    Third Trimester syphilis screen (repeated)         GTT Fasting        GTT 1 Hr        GTT 2 Hr        GTT 3 Hr        Group B Strep  Negative  Negative 12/15/23 1211              Other testing        Test Value Reference Range Date Time    Parvo IgG         CMV IgG                   Drug Screening       Test Value Reference Range Date Time    Amphetamine Screen        Barbiturate Screen        Benzodiazepine Screen        Methadone Screen        Phencyclidine Screen        Opiates Screen        THC Screen        Cocaine Screen        Propoxyphene Screen        Buprenorphine Screen        Methamphetamine Screen        Oxycodone Screen        Tricyclic Antidepressants Screen                  Legend    ^: Historical                          External Prenatal Results       Pregnancy Outside Results - Transcribed From Office Records - See Scanned Records For Details       Test Value Date Time    ABO  B  12/31/23 0936    Rh  Negative  12/31/23 0936    Antibody Screen  Positive  12/31/23 0936       Positive  12/02/23 1902       Positive  11/29/23 1217       Negative  05/10/23 0957    Varicella IgG  <135 index 05/10/23 0957    Rubella  <0.90 index 05/10/23 0957    Hgb  9.4 g/dL 12/31/23 0936       9.5 g/dL 12/02/23 1548       8.6  g/dL 23 0649       9.0 g/dL 23 1217       10.2 g/dL 10/09/23 1230       13.0 g/dL 05/10/23 0957    Hct  29.7 % 23 0936       29.6 % 23 1548       26.9 % 23 0649       29.1 % 23 1217       30.5 % 10/09/23 1230       38.2 % 05/10/23 0957    Glucose Fasting GTT       Glucose Tolerance Test 1 hour       Glucose Tolerance Test 3 hour       Gonorrhea (discrete)  Negative  05/10/23 1109    Chlamydia (discrete)  Negative  05/10/23 1109    RPR  Non Reactive  05/10/23 0957    VDRL       Syphilis Antibody ^ <0.2 AI 21 1328    HBsAg  Negative  05/10/23 0957    Herpes Simplex Virus PCR       Herpes Simplex VIrus Culture       HIV  Non Reactive  05/10/23 0957    Hep C RNA Quant PCR       Hep C Antibody  Non Reactive  05/10/23 0957    AFP       Group B Strep  Negative  12/15/23 1211    GBS Susceptibility to Clindamycin       GBS Susceptibility to Erythromycin       Fetal Fibronectin       Genetic Testing, Maternal Blood                 Drug Screening       Test Value Date Time    Urine Drug Screen       Amphetamine Screen       Barbiturate Screen       Benzodiazepine Screen       Methadone Screen       Phencyclidine Screen       Opiates Screen       THC Screen       Cocaine Screen       Propoxyphene Screen       Buprenorphine Screen       Methamphetamine Screen       Oxycodone Screen       Tricyclic Antidepressants Screen                 Legend    ^: Historical                             Past OB History:     OB History    Para Term  AB Living   2 1 1 0 0 1   SAB IAB Ectopic Molar Multiple Live Births   0 0 0 0 0 1      # Outcome Date GA Lbr [place]/2nd Weight Sex Delivery Anes PTL Lv   2 Current            1 Term 21 40w2d  3062 g (6 lb 12 oz) M Vag-Spont EPI N DAVID       Past Medical History: Past Medical History:   Diagnosis Date    Strep throat       Past Surgical History Past Surgical History:   Procedure Laterality Date    FOOT SURGERY  at age 9    KIDNEY SURGERY  at  age 5    TONSILLECTOMY      at age 9    WISDOM TOOTH EXTRACTION        Family History: Family History   Problem Relation Age of Onset    No Known Problems Mother     No Known Problems Father     Breast cancer Neg Hx     Ovarian cancer Neg Hx     Uterine cancer Neg Hx     Colon cancer Neg Hx     Deep vein thrombosis Neg Hx     Pulmonary embolism Neg Hx       Social History:  reports that she has quit smoking. Her smoking use included electronic cigarette. She has never used smokeless tobacco.   reports no history of alcohol use.   reports no history of drug use.        General ROS: Pertinent items are noted in HPI, all other systems reviewed and negative    Objective       Vital Signs Range for the last 24 hours  Temperature: Temp:  [97.7 °F (36.5 °C)-98.5 °F (36.9 °C)] 98.5 °F (36.9 °C)   Temp Source: Temp src: Oral   BP: BP: (103-115)/(63-73) 109/63   Pulse: Heart Rate:  [74-98] 80   Respirations: Resp:  [16-18] 16   SPO2: SpO2:  [98 %-100 %] 98 %   O2 Amount (l/min):     O2 Devices Device (Oxygen Therapy): room air   Weight: Weight:  [81.1 kg (178 lb 12.8 oz)] 81.1 kg (178 lb 12.8 oz)     Physical Examination: General appearance - alert, well appearing, and in no distress  Chest - clear to auscultation, no wheezes, rales or rhonchi, symmetric air entry  Heart - normal rate and regular rhythm  Abdomen - gravid, nontender  Pelvic - Cervix 2 cm/50%/-2  AROM performed with return of clear fluid  Extremities - peripheral pulses normal, no pedal edema, no clubbing or cyanosis    Presentation: vertex   Cervix: Exam by: Method: sterile exam per RN   Dilation: Cervical Dilation (cm): 1-2   Effacement: Cervical Effacement: 50%   Station: Fetal Station: 1-->-2       Fetal Heart Rate Assessment   Method: Fetal HR Assessment Method: external   Beats/min: Fetal HR (beats/min): 135   Baseline: Fetal HR Baseline: normal range   Variability: Fetal HR Variability: moderate (amplitude range 6 to 25 bpm)   Accels: Fetal HR  Accelerations: greater than/equal to 15 bpm, lasting at least 15 seconds   Decels: Fetal HR Decelerations: absent   Tracing Category:       Uterine Assessment   Method: Method: external tocotransducer   Frequency (min): Contraction Frequency (Minutes): x1   Ctx Count in 10 min:     Duration:     Intensity: Contraction Intensity: mild by palpation   Intensity by IUPC:     Resting Tone: Uterine Resting Tone: soft by palpation   Resting Tone by IUPC:     Daniele Units:         Assessment & Plan       Encounter for supervision of other normal pregnancy in third trimester        Assessment:  1.  Intrauterine pregnancy at 39w1d gestation with reassuring fetal status.    2.  induction of labor  for elective reasons  with favorable cervix  3.  Obstetrical history significant for is non-contributory.  4.  GBS status:   Strep Gp B ELISEO   Date Value Ref Range Status   12/15/2023 Negative Negative Final     Comment:     Centers for Disease Control and Prevention (CDC) and American Congress  of Obstetricians and Gynecologists (ACOG) guidelines for prevention of   group B streptococcal (GBS) disease specify co-collection of  a vaginal and rectal swab specimen to maximize sensitivity of GBS  detection. Per the CDC and ACOG, swabbing both the lower vagina and  rectum substantially increases the yield of detection compared with  sampling the vagina alone.  Penicillin G, ampicillin, or cefazolin are indicated for intrapartum  prophylaxis of  GBS colonization. Reflex susceptibility  testing should be performed prior to use of clindamycin only on GBS  isolates from penicillin-allergic women who are considered a high risk  for anaphylaxis. Treatment with vancomycin without additional testing  is warranted if resistance to clindamycin is noted.         Plan:  1. fetal and uterine monitoring  continuously, labor augmentation  Pitocin, and analgesia with  epidural  2. Plan of care has been reviewed with patient and  significant other.  3.  Risks, benefits of treatment plan have been discussed.  4.  All questions have been answered.      Torri Byrd MD  12/31/2023  12:02 EST

## 2024-01-01 LAB
BASOPHILS # BLD AUTO: 0.03 10*3/MM3 (ref 0–0.2)
BASOPHILS NFR BLD AUTO: 0.2 % (ref 0–1.5)
DEPRECATED RDW RBC AUTO: 44.7 FL (ref 37–54)
EOSINOPHIL # BLD AUTO: 0.07 10*3/MM3 (ref 0–0.4)
EOSINOPHIL NFR BLD AUTO: 0.5 % (ref 0.3–6.2)
ERYTHROCYTE [DISTWIDTH] IN BLOOD BY AUTOMATED COUNT: 16 % (ref 12.3–15.4)
HCT VFR BLD AUTO: 29.4 % (ref 34–46.6)
HGB BLD-MCNC: 9.1 G/DL (ref 12–15.9)
IMM GRANULOCYTES # BLD AUTO: 0.09 10*3/MM3 (ref 0–0.05)
IMM GRANULOCYTES NFR BLD AUTO: 0.7 % (ref 0–0.5)
LYMPHOCYTES # BLD AUTO: 1.91 10*3/MM3 (ref 0.7–3.1)
LYMPHOCYTES NFR BLD AUTO: 14.5 % (ref 19.6–45.3)
MCH RBC QN AUTO: 24.2 PG (ref 26.6–33)
MCHC RBC AUTO-ENTMCNC: 31 G/DL (ref 31.5–35.7)
MCV RBC AUTO: 78.2 FL (ref 79–97)
MONOCYTES # BLD AUTO: 0.79 10*3/MM3 (ref 0.1–0.9)
MONOCYTES NFR BLD AUTO: 6 % (ref 5–12)
NEUTROPHILS NFR BLD AUTO: 10.25 10*3/MM3 (ref 1.7–7)
NEUTROPHILS NFR BLD AUTO: 78.1 % (ref 42.7–76)
NRBC BLD AUTO-RTO: 0 /100 WBC (ref 0–0.2)
PLATELET # BLD AUTO: 165 10*3/MM3 (ref 140–450)
PMV BLD AUTO: 11.5 FL (ref 6–12)
RBC # BLD AUTO: 3.76 10*6/MM3 (ref 3.77–5.28)
WBC NRBC COR # BLD AUTO: 13.14 10*3/MM3 (ref 3.4–10.8)

## 2024-01-01 PROCEDURE — 85025 COMPLETE CBC W/AUTO DIFF WBC: CPT | Performed by: OBSTETRICS & GYNECOLOGY

## 2024-01-01 PROCEDURE — 0503F POSTPARTUM CARE VISIT: CPT | Performed by: OBSTETRICS & GYNECOLOGY

## 2024-01-01 RX ORDER — FERROUS SULFATE 325(65) MG
325 TABLET ORAL
Status: DISCONTINUED | OUTPATIENT
Start: 2024-01-01 | End: 2024-01-02 | Stop reason: HOSPADM

## 2024-01-01 RX ADMIN — IBUPROFEN 600 MG: 600 TABLET, FILM COATED ORAL at 22:37

## 2024-01-01 RX ADMIN — IBUPROFEN 600 MG: 600 TABLET, FILM COATED ORAL at 08:02

## 2024-01-01 RX ADMIN — DOCUSATE SODIUM 100 MG: 100 CAPSULE, LIQUID FILLED ORAL at 22:37

## 2024-01-01 RX ADMIN — FERROUS SULFATE TAB 325 MG (65 MG ELEMENTAL FE) 325 MG: 325 (65 FE) TAB at 12:57

## 2024-01-01 RX ADMIN — Medication 1 TABLET: at 09:39

## 2024-01-01 RX ADMIN — IBUPROFEN 600 MG: 600 TABLET, FILM COATED ORAL at 15:37

## 2024-01-01 RX ADMIN — DOCUSATE SODIUM 100 MG: 100 CAPSULE, LIQUID FILLED ORAL at 09:39

## 2024-01-01 RX ADMIN — ACETAMINOPHEN 650 MG: 325 TABLET, FILM COATED ORAL at 12:57

## 2024-01-01 NOTE — LACTATION NOTE
This note was copied from a baby's chart.  P2 term baby. Mom is breast feeding , supplementing with formula and denies any concerns. Encouraged to call for any assistance.

## 2024-01-01 NOTE — PLAN OF CARE
Goal Outcome Evaluation:    PP day 0. VSS, up ad annalisa. Pain controlled with prn medications. Breast and bottle feeding infant.

## 2024-01-01 NOTE — PROGRESS NOTES
Postpartum Progress Note      Status post Vaginal Delivery: Doing well postoperatively.     1) postpartum care immediately following delivery : Doing well, routine care   2) Anemia postpartum Hg was 9.4 g pre delivery - so pregnancy related iron deficiency with minimal loss, Hg 9.1 g today. Adding oral iron. Asymptomatic at this time     Rh status: B negative - Infant also B negative, No rhogam needed  Rubella: Non-immune, Varicella non-immune, vaccinate postpartum   Gender: male    Desires circumcision   R/B/A reviewed.   Voiced understanding   Wishes to proceed as planned     Subjective     Postpartum Day 1: Vaginal delivery    The patient feels well. The patient denies emotional concerns. Pain is well controlled with current medications. The baby is well. The patient is ambulating well. The patient is tolerating a normal diet.     Objective     Vital signs in last 24 hours:  Temp:  [97.3 °F (36.3 °C)-99.9 °F (37.7 °C)] 98.4 °F (36.9 °C)  Heart Rate:  [65-98] 87  Resp:  [15-17] 16  BP: ()/(42-88) 111/73      General:    alert, appears stated age, and cooperative   Abdomen:  Soft, Non-tender    Lochia:  appropriate   Uterine Fundus:   firm   Ext    Edema 1+   DVT Evaluation:  No evidence of DVT seen on physical exam.     Pending CBC this AM     Lab Results   Component Value Date    WBC 13.14 (H) 01/01/2024    HGB 9.1 (L) 01/01/2024    HCT 29.4 (L) 01/01/2024    MCV 78.2 (L) 01/01/2024     01/01/2024         Murphy Varela MD  1/1/2024  09:46 EST

## 2024-01-01 NOTE — PLAN OF CARE
Goal Outcome Evaluation:      Patient stable, ambulating without difficulty, voiding, pain managed with current plan

## 2024-01-02 ENCOUNTER — PATIENT OUTREACH (OUTPATIENT)
Dept: LABOR AND DELIVERY | Facility: HOSPITAL | Age: 24
End: 2024-01-02
Payer: COMMERCIAL

## 2024-01-02 VITALS
HEIGHT: 62 IN | SYSTOLIC BLOOD PRESSURE: 116 MMHG | HEART RATE: 71 BPM | BODY MASS INDEX: 32.7 KG/M2 | RESPIRATION RATE: 16 BRPM | DIASTOLIC BLOOD PRESSURE: 78 MMHG | TEMPERATURE: 98.2 F | OXYGEN SATURATION: 100 %

## 2024-01-02 PROCEDURE — 25010000002 MEASLES, MUMPS & RUBELLA VAC RECONSTITUTED SOLUTION: Performed by: OBSTETRICS & GYNECOLOGY

## 2024-01-02 PROCEDURE — 90471 IMMUNIZATION ADMIN: CPT | Performed by: OBSTETRICS & GYNECOLOGY

## 2024-01-02 PROCEDURE — 90707 MMR VACCINE SC: CPT | Performed by: OBSTETRICS & GYNECOLOGY

## 2024-01-02 PROCEDURE — 0503F POSTPARTUM CARE VISIT: CPT | Performed by: NURSE PRACTITIONER

## 2024-01-02 RX ORDER — FERROUS SULFATE 325(65) MG
325 TABLET ORAL
Qty: 30 TABLET | Refills: 2 | Status: SHIPPED | OUTPATIENT
Start: 2024-01-02

## 2024-01-02 RX ORDER — IBUPROFEN 600 MG/1
600 TABLET ORAL EVERY 8 HOURS PRN
Qty: 90 TABLET | Refills: 1 | Status: SHIPPED | OUTPATIENT
Start: 2024-01-02

## 2024-01-02 RX ORDER — PSEUDOEPHEDRINE HCL 30 MG
100 TABLET ORAL 2 TIMES DAILY
Qty: 30 CAPSULE | Refills: 0 | Status: SHIPPED | OUTPATIENT
Start: 2024-01-02

## 2024-01-02 RX ADMIN — Medication 1 TABLET: at 08:14

## 2024-01-02 RX ADMIN — MEASLES, MUMPS, AND RUBELLA VIRUS VACCINE LIVE 0.5 ML: 1000; 12500; 1000 INJECTION, POWDER, LYOPHILIZED, FOR SUSPENSION SUBCUTANEOUS at 11:00

## 2024-01-02 RX ADMIN — IBUPROFEN 600 MG: 600 TABLET, FILM COATED ORAL at 08:15

## 2024-01-02 RX ADMIN — DOCUSATE SODIUM 100 MG: 100 CAPSULE, LIQUID FILLED ORAL at 08:14

## 2024-01-02 RX ADMIN — VARICELLA VIRUS VACCINE LIVE 0.5 ML: 1350 INJECTION, POWDER, LYOPHILIZED, FOR SUSPENSION SUBCUTANEOUS at 10:57

## 2024-01-02 RX ADMIN — FERROUS SULFATE TAB 325 MG (65 MG ELEMENTAL FE) 325 MG: 325 (65 FE) TAB at 08:14

## 2024-01-02 NOTE — OUTREACH NOTE
[facility]  IP Postpartum    Questions/Answers      Flowsheet Row Responses   Best Method for Contacting Cell   Support Person Present Yes   Does the patient have a car seat at the hospital Yes   Delivery Note Reviewed Reviewed   Were birth expectations met? Yes   Is there a need for additional support/resources? No   Lactation Note Reviewed Reviewed   Is additional support needed? No   Any questions or concerns? No   Is the patient going to use Meds to Beds? Yes   Confirm Postpartum OB appointment --  [reviewed]   Confirm initial well-child Pediatrician appointment date/time: --  [reviewed]   Additional post-discharge F/U appointments No   Does patient have transportation to appointments? Yes   Any other assistance needed to ensure she is able to attend appointments? No   Does patient have supplies needed at home for  care? Breast Pump, Clothing, Crib, Diapers          Visited pt at the bedside, she is doing well. She is breast and bottle feeding. She will likely miss the [facility] rep so number sent again. She has all necessary infant supplies and we reviewed f/u appt. F/u phone call in one week.     Jessica Farnsworth RN  Maternity Nurse Navigator    2024, 09:20 EST

## 2024-01-02 NOTE — PLAN OF CARE
Goal Outcome Evaluation:            PATIENT EDUCATION COMPLETE. PATIENT READY FOR DISCHARGE TO HOME WITH .

## 2024-01-02 NOTE — DISCHARGE SUMMARY
Date of Discharge:  1/2/2024    Discharge Diagnosis: s/p vaginal delivery     Presenting Problem/History of Present Illness  Encounter for supervision of other normal pregnancy in third trimester [Z34.83]  Encounter for supervision of other normal pregnancy in third trimester [Z34.83]     Hospital Course  Patient is a 23 y.o. female presented elective induction of labor.  Her pregnancy was uncomplicated.  She delivered a viable male infant weighting 7lb 6.2oz with apgars of 8&9. For further information surrounding this delivery please seen delivery note. Her postpartum course has been uncomplicated. She has hx anemia. Hgb is stable at 9.1, she is asymptomatic. She is voiding adequately, tolerating a regular diet, and she is ambulating without difficulty or assistance. Her pain is well controlled. We have reviewed discharge instructions in detail.     Procedures Performed         Consults:   Consults       No orders found from 12/2/2023 to 1/1/2024.            Pertinent Test Results:   WBC   Date Value Ref Range Status   01/01/2024 13.14 (H) 3.40 - 10.80 10*3/mm3 Final     RBC   Date Value Ref Range Status   01/01/2024 3.76 (L) 3.77 - 5.28 10*6/mm3 Final     Hemoglobin   Date Value Ref Range Status   01/01/2024 9.1 (L) 12.0 - 15.9 g/dL Final     Hematocrit   Date Value Ref Range Status   01/01/2024 29.4 (L) 34.0 - 46.6 % Final     MCV   Date Value Ref Range Status   01/01/2024 78.2 (L) 79.0 - 97.0 fL Final     MCH   Date Value Ref Range Status   01/01/2024 24.2 (L) 26.6 - 33.0 pg Final     MCHC   Date Value Ref Range Status   01/01/2024 31.0 (L) 31.5 - 35.7 g/dL Final     RDW   Date Value Ref Range Status   01/01/2024 16.0 (H) 12.3 - 15.4 % Final     RDW-SD   Date Value Ref Range Status   01/01/2024 44.7 37.0 - 54.0 fl Final     MPV   Date Value Ref Range Status   01/01/2024 11.5 6.0 - 12.0 fL Final     Platelets   Date Value Ref Range Status   01/01/2024 165 140 - 450 10*3/mm3 Final     Neutrophil %   Date Value Ref  Range Status   01/01/2024 78.1 (H) 42.7 - 76.0 % Final     Lymphocyte %   Date Value Ref Range Status   01/01/2024 14.5 (L) 19.6 - 45.3 % Final     Monocyte %   Date Value Ref Range Status   01/01/2024 6.0 5.0 - 12.0 % Final     Eosinophil %   Date Value Ref Range Status   01/01/2024 0.5 0.3 - 6.2 % Final     Basophil %   Date Value Ref Range Status   01/01/2024 0.2 0.0 - 1.5 % Final     Immature Grans %   Date Value Ref Range Status   01/01/2024 0.7 (H) 0.0 - 0.5 % Final     Neutrophils, Absolute   Date Value Ref Range Status   01/01/2024 10.25 (H) 1.70 - 7.00 10*3/mm3 Final     Lymphocytes, Absolute   Date Value Ref Range Status   01/01/2024 1.91 0.70 - 3.10 10*3/mm3 Final     Monocytes, Absolute   Date Value Ref Range Status   01/01/2024 0.79 0.10 - 0.90 10*3/mm3 Final     Eosinophils, Absolute   Date Value Ref Range Status   01/01/2024 0.07 0.00 - 0.40 10*3/mm3 Final     Basophils, Absolute   Date Value Ref Range Status   01/01/2024 0.03 0.00 - 0.20 10*3/mm3 Final     Immature Grans, Absolute   Date Value Ref Range Status   01/01/2024 0.09 (H) 0.00 - 0.05 10*3/mm3 Final     nRBC   Date Value Ref Range Status   01/01/2024 0.0 0.0 - 0.2 /100 WBC Final       Condition on Discharge:  Stable    Vital Signs  Temp:  [97.8 °F (36.6 °C)-98.2 °F (36.8 °C)] 98.2 °F (36.8 °C)  Heart Rate:  [71-91] 71  Resp:  [16] 16  BP: (103-116)/(72-78) 116/78    Physical Exam:   See Progress Note    Discharge Disposition  Home or Self Care    Discharge Medications     Discharge Medications        New Medications        Instructions Start Date   docusate sodium 100 MG capsule   100 mg, Oral, 2 Times Daily      ibuprofen 600 MG tablet  Commonly known as: ADVIL,MOTRIN   600 mg, Oral, Every 8 Hours PRN             Continue These Medications        Instructions Start Date   acetaminophen 500 MG tablet  Commonly known as: TYLENOL   1,000 mg, Oral, Every 6 Hours PRN      ferrous sulfate 325 (65 FE) MG tablet   325 mg, Oral, Daily With  Breakfast      Prenatal Formula 28-0.8-235 MG capsule   No dose, route, or frequency recorded.             Stop These Medications      cyclobenzaprine 5 MG tablet  Commonly known as: FLEXERIL              Discharge Diet: Regular    Activity at Discharge: Pelvic rest X6 weeks    Education: Warning signs and symptoms given, no tub baths, nothing in the vagina for 6 weeks, no driving for 2 weeks or while talking narcotics     Follow-up Appointments  No future appointments.  Additional Instructions for the Follow-ups that You Need to Schedule       Discharge Follow-up with Specified Provider: Isma; 6 Weeks   As directed      To: Isma   Follow Up: 6 Weeks                Test Results Pending at Discharge       Samantha Schrader, APRN  01/02/24  08:22 EST    Time: 08:22 EST

## 2024-01-02 NOTE — PLAN OF CARE
Goal Outcome Evaluation:         VSS, ambulating and voiding, minimal complaints of pain. No distress noted. Awaiting discharge home.

## 2024-01-02 NOTE — NURSING NOTE
VIS forms given for MMR and varicella, patient reviewed and denies questions, does want vaccines but asked to wait to receive tomorrow.

## 2024-01-02 NOTE — PROGRESS NOTES
Postpartum Progress Note      Status post Vaginal Delivery: Doing well postoperatively.     1) Postpartum care immediately following delivery :    Routine care, discharge home today. Reviewed discharge instructions in detail.     2) Maternal anemia: Chronic. Hgb 9.4 on admission, dropped to 9.1 after delivery. She is asymptomatic. Continue oral iron supplements.     Rh status: B-, infant Rh negative, rhogam not indicated  Rubella: Not immune, MMR ordered  Gender: male, s/p circumcision     Subjective     Postpartum Day 2: Vaginal delivery    The patient feels well. The patient denies emotional concerns. Pain is well controlled with current medications. The baby is well. The patient is ambulating well. The patient is tolerating a normal diet.     Objective     Vital signs in last 24 hours:  Temp:  [97.8 °F (36.6 °C)-98.2 °F (36.8 °C)] 98.2 °F (36.8 °C)  Heart Rate:  [71-91] 71  Resp:  [16] 16  BP: (103-116)/(72-78) 116/78      General:    alert, appears stated age, and cooperative   CV: RRR, no m/r/g   Lungs: CTAB   Abdomen:  Soft, Non-tender    Lochia:  appropriate   Uterine Fundus:   firm   Ext    Edema trace   DVT Evaluation:  No evidence of DVT seen on physical exam.     Lab Results   Component Value Date    WBC 13.14 (H) 01/01/2024    HGB 9.1 (L) 01/01/2024    HCT 29.4 (L) 01/01/2024    MCV 78.2 (L) 01/01/2024     01/01/2024       Samantha Schrader, RANDALL  1/2/2024  08:18 EST

## 2024-01-02 NOTE — LACTATION NOTE
This note was copied from a baby's chart.  Rounded on patient, due to discharge.  Patient has been primarily formula feeding.  D/w pumping schedule if wanting to provide breast milk via bottle as well as cessation instructions if milk production no longer desired.  Patient denies any questions or concerns.  LC number on WB, will follow as needed.

## 2024-01-09 ENCOUNTER — PATIENT OUTREACH (OUTPATIENT)
Dept: LABOR AND DELIVERY | Facility: HOSPITAL | Age: 24
End: 2024-01-09
Payer: COMMERCIAL

## 2024-01-09 NOTE — OUTREACH NOTE
[facility]  Postpartum Check-In    Questions/Answers      Flowsheet Row Responses   Visit Setting Telephone   Best Method for Contacting Cell   OB Discharge Note Reviewed  Reviewed   OB Discharge Medications Reviewed  Reviewed    discharged home with mother? Yes   Current Pain Levels 0-10 1   At Rest Pain Levels 0-10 1   Pain level with activity 0-10 3   Acceptable Pain Level 0-10 1   Pain Location Back   Pain Description Sore   How do you treat your pain? Medications   Verbalized Emotional State Acceptance   Family/Support Network Spouse   Level of Involvement in Care Interactive, Attentive, Supportive   Do you feel comfortable in your relationship with your baby? Yes   Have members of your household adjusted to your baby? Yes   Is the baby's father supportive and/or involved with the baby? No   How does your partner feel about the baby? Happy, Involved   Do you feel safe at home, school and work? Yes   Are you in a relationship with someone who threatens you or hurts you? No   Do you have the resources to keep yourself and your baby healthy and safe? Yes   Lochia (per patient report) Brown-dori Red   Amount Spotting   Number of pads per day 5   Lochia Odor None   Is patient breastfeeding? No   How is breast suppression going? better now   Postpartum Depression Screening Education Education Provided   Doctor Appointments: Education Provided   Postpartum Care Education Education Provided   S & S to report Education Provided   Followup Appointments Made No  [reminded]   Well Child Visit Appointments Made Yes   Well Child Checkup Provider Name CAMI Martin   Well Child Check Up Date: 24   Did you complete the visit? Yes   Were there any specific concerns? No   Umbilical Cord No reported signs or symptoms   Was the baby circumcised? Yes   Circumcision care and signs/symptoms to report Reviewed   Feeding Readiness Cues: Eager, Crying   Infant Feeding Method Formula   Is a lactation referral  indicated? No   Formula Type --  [similac sensitive]   Formula PO (mL) 2 oz   Formula/Expressed Milk frequency of feedings: 2-3 hours   Number of wet diapers x 24 hours 15   Last BM x 24 hours 3   Emesis (Unmeasured Occurence) small amount   What safe sleep surface is available? Bassinet   Are there stuffed animals, toys, pillows, quilts, blankets, wedges, positioners, bumpers or other loose bedding in the infant's sleeping environment? No   Where does the baby usually sleep? Bassinet   Does the baby ever share a sleep surface with a sibling, adult or pet? No   Does the baby ever share a sleep surface in a bed, couch, recliner or other? No   What position do you place your baby to sleep for naps? Back   What position do you place your baby to sleep at night Back   Are you and/or other caregivers smoking inside or outside the baby's home? No   Is the infant dressed appropiately for the temperature of the home? Yes   Do you use a clean, dry pacifier that is not attached to a string or stuffed animal? Yes            Review of Systems    Most Recent [place]  Depression Scale Score (EPDS)    Performed by a clinician: 1 (2024 12:25 PM)    5 Ps Screen  Completed     Pt doing well, she reports some minimal back and pain and light bleeding, occasional clots but small. She is formula feeding and has her WIC appt scheduled for later this month. Her  is still home on leave but will return to the  soon. She reports that she will have good support here while he is gone. Infant has been seen at the peds office with no reported concerns and reminded her to call for her PP appt. Will send to RN call center.     Jessica HELM - HOLDEN  Maternity Nurse Navigator    2024, 12:38 EST

## 2024-01-17 ENCOUNTER — PATIENT OUTREACH (OUTPATIENT)
Dept: CALL CENTER | Facility: HOSPITAL | Age: 24
End: 2024-01-17
Payer: COMMERCIAL

## 2024-01-17 NOTE — OUTREACH NOTE
Motherhood Connection Survey      Flowsheet Row Responses   [facility]   [facility] attempt successful? Yes   Call end time 1344   Baby sex Boy    discharged home with mother? Yes   Baby sex Boy   Delivery type Vaginal   Emotional state Acceptance   Family support Yes   Do you have all necessary resources to care for you and your baby?  Yes   Have members of your household adjusted to your baby? Yes   Did you have any problems with pre-eclampsia during this pregnancy? No   Did you have blood glucose issues during this pregnancy No   Lochia amount Light   Lochia per patient report Serosa   Did you have an episiotomy/tear/abdominal incision? Yes   Feeding Method Bottle   Signs baby is ready to eat Crying   Feeding tolerance Wet/dirty diapers, Weight gain   Number of wet diapers x 24 hours 8-10   Last BM x 24 hours 3-4   Umbilical Cord No reported signs or symptoms   Was the baby circumcised? Yes   Circumcision care and signs/symptoms to report Reviewed   Where does the baby usually sleep? Bassinet   Are there stuffed animals, toys, pillows, quilts, blankets, wedges, positioners, bumpers or other loose bedding in the infant's sleeping environment? No   Does the baby ever share a sleep surface in a bed, couch, recliner or other? No   What position do you lay your baby down to sleep? Back   Are you and/or other caregivers smoking inside or outside the baby's home? No   Mom appointment comments: Appointment is 24   Baby appointment comments: Baby has been to the pediatrician   Call completed? Yes   How satisfied were you with the Motherhood Connection Program? 5              Kaycee HELM - Licensed Nurse

## 2024-01-25 ENCOUNTER — TELEPHONE (OUTPATIENT)
Dept: OBSTETRICS AND GYNECOLOGY | Facility: CLINIC | Age: 24
End: 2024-01-25
Payer: COMMERCIAL

## 2024-01-25 NOTE — TELEPHONE ENCOUNTER
Destiny,     I sent medication for her to start.   Can you let her know   Can take up to 6 weeks for full effect, but should be improving in 2 weeks   Should go to ED if suicidal or homicidal.     Thanks,   Dr. Varela    ----- Message from Destiny Burdick MA sent at 1/25/2024 10:22 AM EST -----  Regarding: FW: Post partum   Contact: [phone]  Dr. Varela,    Would you like to see her?    Thanks,  Destiny  ----- Message -----  From: Caleb LARKIN  Sent: 1/25/2024  10:17 AM EST  To: [facility]  Subject: Post partum                                      I wanted to reach out to see if you had any recommendations of who I could talk to about my mental health. I think I may be starting to get ppd and anxiety and I’ve just really been stressed out. I’m home alone with a toddler and my new born and my  recently had to leave sense he’s in the  and it’s been very hard on me. I just want to take the necessary steps I need to take care of myself right now and I’m just not sure where to start.

## 2024-02-12 ENCOUNTER — POSTPARTUM VISIT (OUTPATIENT)
Dept: OBSTETRICS AND GYNECOLOGY | Facility: CLINIC | Age: 24
End: 2024-02-12
Payer: COMMERCIAL

## 2024-02-12 VITALS
SYSTOLIC BLOOD PRESSURE: 123 MMHG | WEIGHT: 152 LBS | HEART RATE: 79 BPM | HEIGHT: 62 IN | DIASTOLIC BLOOD PRESSURE: 84 MMHG | BODY MASS INDEX: 27.97 KG/M2

## 2024-02-12 DIAGNOSIS — Z30.011 OCP (ORAL CONTRACEPTIVE PILLS) INITIATION: ICD-10-CM

## 2024-02-12 DIAGNOSIS — Z11.3 SCREEN FOR STD (SEXUALLY TRANSMITTED DISEASE): ICD-10-CM

## 2024-02-12 PROCEDURE — 0503F POSTPARTUM CARE VISIT: CPT | Performed by: OBSTETRICS & GYNECOLOGY

## 2024-02-12 RX ORDER — LEVONORGESTREL AND ETHINYL ESTRADIOL 0.15-0.03
1 KIT ORAL DAILY
Qty: 91 TABLET | Refills: 3 | Status: SHIPPED | OUTPATIENT
Start: 2024-02-12 | End: 2025-02-11

## 2024-02-14 LAB
C TRACH RRNA SPEC QL NAA+PROBE: NEGATIVE
N GONORRHOEA RRNA SPEC QL NAA+PROBE: NEGATIVE
T VAGINALIS RRNA SPEC QL NAA+PROBE: NEGATIVE

## 2024-02-15 ENCOUNTER — TELEPHONE (OUTPATIENT)
Dept: OBSTETRICS AND GYNECOLOGY | Facility: CLINIC | Age: 24
End: 2024-02-15
Payer: COMMERCIAL